# Patient Record
Sex: MALE | Race: WHITE | NOT HISPANIC OR LATINO
[De-identification: names, ages, dates, MRNs, and addresses within clinical notes are randomized per-mention and may not be internally consistent; named-entity substitution may affect disease eponyms.]

---

## 2019-09-03 ENCOUNTER — APPOINTMENT (OUTPATIENT)
Dept: ORTHOPEDIC SURGERY | Facility: CLINIC | Age: 64
End: 2019-09-03
Payer: COMMERCIAL

## 2019-09-03 VITALS
WEIGHT: 225 LBS | HEIGHT: 71 IN | HEART RATE: 72 BPM | OXYGEN SATURATION: 98 % | SYSTOLIC BLOOD PRESSURE: 126 MMHG | BODY MASS INDEX: 31.5 KG/M2 | DIASTOLIC BLOOD PRESSURE: 80 MMHG

## 2019-09-03 PROCEDURE — 99203 OFFICE O/P NEW LOW 30 MIN: CPT | Mod: 25

## 2019-09-03 PROCEDURE — 20610 DRAIN/INJ JOINT/BURSA W/O US: CPT | Mod: RT

## 2019-09-16 NOTE — DISCUSSION/SUMMARY
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 he received a cortisone injection today. He will slolwy increase his activities as tolerated. We will see him back on an as needed basis. ALl questions were answered. He will call if any issues arise.

## 2019-09-16 NOTE — HISTORY OF PRESENT ILLNESS
[de-identified] : Peña Arroyo is a 65 yo gentleman with right knee issues since July. He denies any trauma or specific injury. He has had progressive medial knee pain and swelling. He was seen by his PCP and was sent for an MRI which shows degenerative tears of the medial and lateral meniscii. He has tried resting, activity modification and oral Nsaids without relief. He denies any locking or buckling.

## 2019-09-16 NOTE — END OF VISIT
[FreeTextEntry3] : All medical record entries made by JENNY Wheeler, acting as a scribe for this encounter under the direction of Srinivas Camacho MD . I have reviewed the chart and agree that the record accurately reflects my personal performance of the history, physical exam, assessment and plan. I have also personally directed, reviewed, and agreed with the chart.

## 2019-09-16 NOTE — PROCEDURE
[de-identified] : Under strict sterile technique, the right knee was prepped with Betadine. Using the superolateral approach, with the patient supine, 1ml of Kenalog was mixed with 5mls of 1% Lidocaine and 5mLs of 0.5% Marcaine, and was injected intraarticularly. The patient tolerated the procedure well. The patient was instructed to avoid vigorous exercise for 24 hours and will apply ice to the knee for 20 minutes 2-3 times per day if discomfort occurs. Patient will return on an as needed basis. The patient will call if any questions or problems should arise

## 2019-09-16 NOTE — PHYSICAL EXAM
[de-identified] : The patient is a well developed, well nourished male in no apparent distress. He is alert and oriented X 3 with a pleasant mood and appropriate affect. \par \par On physical examination of the right knee, there is full range of motion. The patient walks with a normal gait and stands in neutral alignment. There is 1+ effusion. No warmth or erythema is noted. The patella is non tender to palpation medially or laterally. There is no crepitus noted. The apprehension and grind tests are negative. The extensor mechanism is intact. There is medial joint line tenderness. The Cristi sign is positive. The Lachman and pivot shift tests are negative. There is no varus or valgus laxity at 0 or 30 degrees. No posterolateral or anteromedial laxity is noted. No masses are palpable. No other soft tissue or bony tenderness is noted. Quadriceps weakness is noted. Neurovascular function is intact.   [de-identified] : MRI of the right knee shows degenerative tears of the medial and lateral meniscus--report only--disc images would not load

## 2019-12-10 ENCOUNTER — APPOINTMENT (OUTPATIENT)
Dept: ORTHOPEDIC SURGERY | Facility: CLINIC | Age: 64
End: 2019-12-10
Payer: COMMERCIAL

## 2019-12-10 VITALS
OXYGEN SATURATION: 98 % | SYSTOLIC BLOOD PRESSURE: 124 MMHG | HEART RATE: 60 BPM | BODY MASS INDEX: 31.5 KG/M2 | WEIGHT: 225 LBS | DIASTOLIC BLOOD PRESSURE: 60 MMHG | HEIGHT: 71 IN

## 2019-12-10 DIAGNOSIS — M17.11 UNILATERAL PRIMARY OSTEOARTHRITIS, RIGHT KNEE: ICD-10-CM

## 2019-12-10 PROCEDURE — 99213 OFFICE O/P EST LOW 20 MIN: CPT

## 2019-12-16 PROBLEM — M17.11 PRIMARY OSTEOARTHRITIS OF RIGHT KNEE: Status: ACTIVE | Noted: 2019-09-16

## 2019-12-26 NOTE — HISTORY OF PRESENT ILLNESS
[de-identified] : Peña returns today for evaluation of his right knee. he did not have much improvement following the cortisone injection. he has had persistent medial knee pain and swelling. He has limited ROM and limited walking tolerance. He denies any locking or buckling.

## 2019-12-26 NOTE — DISCUSSION/SUMMARY
[de-identified] : Peña has had persistent medial knee pain despite conservative measures. Arthroscopy was recommended. the risks, benefits and potential complications of surgery were discussed at length and all questions were answered. He will call if any issues arise.

## 2020-01-09 ENCOUNTER — RX RENEWAL (OUTPATIENT)
Age: 65
End: 2020-01-09

## 2020-01-13 ENCOUNTER — OUTPATIENT (OUTPATIENT)
Dept: OUTPATIENT SERVICES | Facility: HOSPITAL | Age: 65
LOS: 1 days | Discharge: HOME | End: 2020-01-13
Payer: COMMERCIAL

## 2020-01-13 ENCOUNTER — APPOINTMENT (OUTPATIENT)
Dept: ORTHOPEDIC SURGERY | Facility: AMBULATORY SURGERY CENTER | Age: 65
End: 2020-01-13

## 2020-01-13 PROCEDURE — 29881 ARTHRS KNE SRG MNISECTMY M/L: CPT | Mod: RT

## 2020-01-23 ENCOUNTER — APPOINTMENT (OUTPATIENT)
Dept: ORTHOPEDIC SURGERY | Facility: CLINIC | Age: 65
End: 2020-01-23
Payer: COMMERCIAL

## 2020-01-23 VITALS
SYSTOLIC BLOOD PRESSURE: 120 MMHG | HEART RATE: 62 BPM | DIASTOLIC BLOOD PRESSURE: 70 MMHG | BODY MASS INDEX: 31.5 KG/M2 | OXYGEN SATURATION: 98 % | HEIGHT: 71 IN | WEIGHT: 225 LBS

## 2020-01-23 DIAGNOSIS — S83.241A OTHER TEAR OF MEDIAL MENISCUS, CURRENT INJURY, RIGHT KNEE, INITIAL ENCOUNTER: ICD-10-CM

## 2020-01-23 PROCEDURE — 99024 POSTOP FOLLOW-UP VISIT: CPT

## 2020-01-23 RX ORDER — HYDROCODONE BITARTRATE AND ACETAMINOPHEN 5; 325 MG/1; MG/1
5-325 TABLET ORAL
Qty: 20 | Refills: 0 | Status: COMPLETED | COMMUNITY
Start: 2020-01-09 | End: 2020-01-23

## 2020-01-23 RX ORDER — LISINOPRIL 30 MG/1
TABLET ORAL
Refills: 0 | Status: ACTIVE | COMMUNITY

## 2020-01-23 RX ORDER — ATORVASTATIN CALCIUM 80 MG/1
TABLET, FILM COATED ORAL
Refills: 0 | Status: ACTIVE | COMMUNITY

## 2020-02-03 NOTE — HISTORY OF PRESENT ILLNESS
[Excellent Pain Control] : has excellent pain control [No Sign of Infection] : is showing no signs of infection [de-identified] : Peña returns today for his first post op visit. he is one week s/p right knee arthroscopy with partial medial meniscectomy [de-identified] : Dirk is doing well. He denies any significant pain or swelling. he denies any fevers or chills. He stopped using the cane and reports steady improvement in his walking tolerance. [de-identified] : on exam of his right knee, his ROm is 0-115 degrees/. Wounds are clean,dry and intact. there is no warmth or erythema noted. there is mild quad atrophy noted [de-identified] : Dirk is doing well [de-identified] : Under strict sterile technique, sutures were removed and steri strips applied. Surgical findings were reviewed. He will begin a course of supervised PT and will return in one month for reevaluation. All questions were answered. He will call if any issues arise.

## 2022-02-11 ENCOUNTER — OUTPATIENT (OUTPATIENT)
Dept: OUTPATIENT SERVICES | Facility: HOSPITAL | Age: 67
LOS: 1 days | Discharge: HOME | End: 2022-02-11
Payer: COMMERCIAL

## 2022-02-11 ENCOUNTER — TRANSCRIPTION ENCOUNTER (OUTPATIENT)
Age: 67
End: 2022-02-11

## 2022-02-11 LAB
ALBUMIN SERPL ELPH-MCNC: 3.7 G/DL — SIGNIFICANT CHANGE UP (ref 3.3–5)
ALP SERPL-CCNC: 67 U/L — SIGNIFICANT CHANGE UP (ref 40–120)
ALT FLD-CCNC: 26 U/L — SIGNIFICANT CHANGE UP (ref 10–45)
ANION GAP SERPL CALC-SCNC: 7 MMOL/L — SIGNIFICANT CHANGE UP (ref 5–17)
AST SERPL-CCNC: 26 U/L — SIGNIFICANT CHANGE UP (ref 10–40)
BILIRUB SERPL-MCNC: 0.8 MG/DL — SIGNIFICANT CHANGE UP (ref 0.2–1.2)
BUN SERPL-MCNC: 12 MG/DL — SIGNIFICANT CHANGE UP (ref 7–23)
CALCIUM SERPL-MCNC: 9 MG/DL — SIGNIFICANT CHANGE UP (ref 8.4–10.5)
CHLORIDE SERPL-SCNC: 108 MMOL/L — SIGNIFICANT CHANGE UP (ref 96–108)
CO2 SERPL-SCNC: 28 MMOL/L — SIGNIFICANT CHANGE UP (ref 22–31)
CREAT SERPL-MCNC: 1 MG/DL — SIGNIFICANT CHANGE UP (ref 0.5–1.3)
GLUCOSE SERPL-MCNC: 113 MG/DL — HIGH (ref 70–99)
POTASSIUM SERPL-MCNC: 4.3 MMOL/L — SIGNIFICANT CHANGE UP (ref 3.5–5.3)
POTASSIUM SERPL-SCNC: 4.3 MMOL/L — SIGNIFICANT CHANGE UP (ref 3.5–5.3)
PROT SERPL-MCNC: 6.8 G/DL — SIGNIFICANT CHANGE UP (ref 6–8.3)
SODIUM SERPL-SCNC: 143 MMOL/L — SIGNIFICANT CHANGE UP (ref 135–145)

## 2022-02-11 PROCEDURE — ZZZZZ: CPT

## 2022-02-11 NOTE — BRIEF OPERATIVE NOTE - NSICDXBRIEFPROCEDURE_GEN_ALL_CORE_FT
PROCEDURES:  Partial palmar fasciectomy with release 11-Feb-2022 16:26:51  Trish Treviño  Exploration of nerve of little finger 11-Feb-2022 16:27:05  Trish Treviño

## 2022-02-11 NOTE — BRIEF OPERATIVE NOTE - NSICDXBRIEFPOSTOP_GEN_ALL_CORE_FT
POST-OP DIAGNOSIS:  Dupuytren contracture 11-Feb-2022 16:27:51 Right palm/5th finger Trish Treviño

## 2022-02-21 ENCOUNTER — RESULT REVIEW (OUTPATIENT)
Age: 67
End: 2022-02-21

## 2022-02-21 PROCEDURE — 88304 TISSUE EXAM BY PATHOLOGIST: CPT | Mod: 26

## 2022-02-24 LAB — SURGICAL PATHOLOGY STUDY: SIGNIFICANT CHANGE UP

## 2025-04-10 ENCOUNTER — NON-APPOINTMENT (OUTPATIENT)
Age: 70
End: 2025-04-10

## 2025-05-12 ENCOUNTER — NON-APPOINTMENT (OUTPATIENT)
Age: 70
End: 2025-05-12

## 2025-05-13 ENCOUNTER — NON-APPOINTMENT (OUTPATIENT)
Age: 70
End: 2025-05-13

## 2025-05-13 ENCOUNTER — APPOINTMENT (OUTPATIENT)
Dept: UROLOGY | Facility: CLINIC | Age: 70
End: 2025-05-13
Payer: COMMERCIAL

## 2025-05-13 VITALS
TEMPERATURE: 97.8 F | HEIGHT: 71 IN | SYSTOLIC BLOOD PRESSURE: 130 MMHG | BODY MASS INDEX: 27.44 KG/M2 | HEART RATE: 65 BPM | DIASTOLIC BLOOD PRESSURE: 57 MMHG | WEIGHT: 196 LBS

## 2025-05-13 DIAGNOSIS — R39.9 UNSPECIFIED SYMPTOMS AND SIGNS INVOLVING THE GENITOURINARY SYSTEM: ICD-10-CM

## 2025-05-13 PROCEDURE — 51798 US URINE CAPACITY MEASURE: CPT

## 2025-05-13 PROCEDURE — 99204 OFFICE O/P NEW MOD 45 MIN: CPT

## 2025-05-13 RX ORDER — TAMSULOSIN HYDROCHLORIDE 0.4 MG/1
0.4 CAPSULE ORAL
Qty: 90 | Refills: 3 | Status: ACTIVE | COMMUNITY
Start: 2025-05-13 | End: 1900-01-01

## 2025-05-15 ENCOUNTER — NON-APPOINTMENT (OUTPATIENT)
Age: 70
End: 2025-05-15

## 2025-05-15 LAB
APPEARANCE: ABNORMAL
BACTERIA UR CULT: NORMAL
BACTERIA: NEGATIVE /HPF
BILIRUBIN URINE: NEGATIVE
BLOOD URINE: NEGATIVE
CAST: 0 /LPF
COLOR: NORMAL
EPITHELIAL CELLS: 0 /HPF
GLUCOSE QUALITATIVE U: NEGATIVE MG/DL
KETONES URINE: NEGATIVE MG/DL
LEUKOCYTE ESTERASE URINE: ABNORMAL
MICROSCOPIC-UA: NORMAL
MUCUS: PRESENT
NITRITE URINE: NEGATIVE
PH URINE: 5.5
PROTEIN URINE: NEGATIVE MG/DL
RED BLOOD CELLS URINE: 3 /HPF
REVIEW: NORMAL
SPECIFIC GRAVITY URINE: 1.02
UROBILINOGEN URINE: 0.2 MG/DL
WHITE BLOOD CELLS URINE: 1 /HPF

## 2025-05-16 ENCOUNTER — APPOINTMENT (OUTPATIENT)
Dept: HEART AND VASCULAR | Facility: CLINIC | Age: 70
End: 2025-05-16
Payer: COMMERCIAL

## 2025-05-16 ENCOUNTER — NON-APPOINTMENT (OUTPATIENT)
Age: 70
End: 2025-05-16

## 2025-05-16 VITALS
WEIGHT: 195 LBS | DIASTOLIC BLOOD PRESSURE: 59 MMHG | TEMPERATURE: 97.9 F | OXYGEN SATURATION: 99 % | HEART RATE: 48 BPM | BODY MASS INDEX: 27.3 KG/M2 | HEIGHT: 71 IN | SYSTOLIC BLOOD PRESSURE: 120 MMHG

## 2025-05-16 DIAGNOSIS — R00.1 BRADYCARDIA, UNSPECIFIED: ICD-10-CM

## 2025-05-16 DIAGNOSIS — Z78.9 OTHER SPECIFIED HEALTH STATUS: ICD-10-CM

## 2025-05-16 DIAGNOSIS — Z72.3 LACK OF PHYSICAL EXERCISE: ICD-10-CM

## 2025-05-16 DIAGNOSIS — I10 ESSENTIAL (PRIMARY) HYPERTENSION: ICD-10-CM

## 2025-05-16 DIAGNOSIS — E78.00 PURE HYPERCHOLESTEROLEMIA, UNSPECIFIED: ICD-10-CM

## 2025-05-16 DIAGNOSIS — F10.90 ALCOHOL USE, UNSPECIFIED, UNCOMPLICATED: ICD-10-CM

## 2025-05-16 DIAGNOSIS — Z80.0 FAMILY HISTORY OF MALIGNANT NEOPLASM OF DIGESTIVE ORGANS: ICD-10-CM

## 2025-05-16 DIAGNOSIS — K30 FUNCTIONAL DYSPEPSIA: ICD-10-CM

## 2025-05-16 PROCEDURE — 99203 OFFICE O/P NEW LOW 30 MIN: CPT

## 2025-05-16 PROCEDURE — 93000 ELECTROCARDIOGRAM COMPLETE: CPT

## 2025-05-16 PROCEDURE — 93242 EXT ECG>48HR<7D RECORDING: CPT

## 2025-05-16 RX ORDER — LISINOPRIL 20 MG/1
20 TABLET ORAL
Qty: 90 | Refills: 3 | Status: ACTIVE | COMMUNITY
Start: 2025-05-16

## 2025-05-16 RX ORDER — ROSUVASTATIN CALCIUM 20 MG/1
20 TABLET, FILM COATED ORAL
Qty: 90 | Refills: 3 | Status: ACTIVE | COMMUNITY
Start: 2025-05-16

## 2025-05-16 RX ORDER — FAMOTIDINE/CA CARB/MAG HYDROX 10-800-165
10-800-165 TABLET,CHEWABLE ORAL
Qty: 90 | Refills: 0 | Status: ACTIVE | COMMUNITY
Start: 2025-05-16

## 2025-05-30 PROCEDURE — 93244 EXT ECG>48HR<7D REV&INTERPJ: CPT

## (undated) DEVICE — TOURNIQUET CUFF 18" DUAL PORT SINGLE BLADDER W PLC  (BLACK)

## (undated) DEVICE — WARMING BLANKET LOWER ADULT

## (undated) DEVICE — GLV 8 PROTEXIS (WHITE)

## (undated) DEVICE — SLV COMPRESSION KNEE MED

## (undated) DEVICE — GLV 8.5 PROTEXIS (WHITE)

## (undated) DEVICE — IMMOBILIZER HAND ALUMINUM LARGE

## (undated) DEVICE — MARKING PEN W RULER

## (undated) DEVICE — SUT ETHILON 5-0 18" P-3

## (undated) DEVICE — PACK HAND

## (undated) DEVICE — SUT SILK 4-0 18" PS-2